# Patient Record
(demographics unavailable — no encounter records)

---

## 2025-07-01 NOTE — ASSESSMENT
[FreeTextEntry1] : Alert, oriented, well, pleasant.  few papules face, moderate comedones, 1 papulonodule chin. numerous post inflammatory erythematous macules. Trunk clear.  Acne Options discussed at length. discontinue spironolactone 100 daily, on only 1 month, without improvement. took it previously without improvement  Dove sensitive soap non-comedogenic avoid sugary drinks Differin Gel apply nightly, on a dry face, very thin layer. Side effects discussed with patient. start doxycycline 50 daily. Side effects discussed with patient.  2m

## 2025-07-01 NOTE — HISTORY OF PRESENT ILLNESS
[FreeTextEntry1] : Acne. Began during teenage years. 100mg spironolactone daily. No improvement. With mom, pharmacist. [de-identified] : Memo